# Patient Record
Sex: FEMALE | Race: WHITE | HISPANIC OR LATINO | URBAN - METROPOLITAN AREA
[De-identification: names, ages, dates, MRNs, and addresses within clinical notes are randomized per-mention and may not be internally consistent; named-entity substitution may affect disease eponyms.]

---

## 2019-10-16 ENCOUNTER — LAB (OUTPATIENT)
Dept: LAB | Facility: URGENT CARE | Age: 44
End: 2019-10-16

## 2019-10-16 PROCEDURE — 99000 SPECIMEN HANDLING OFFICE-LAB: CPT | Performed by: PREVENTIVE MEDICINE

## 2021-04-01 DIAGNOSIS — Z23 HIGH PRIORITY FOR 2019-NCOV VACCINE: ICD-10-CM

## 2021-04-12 ENCOUNTER — CLINICAL SUPPORT (OUTPATIENT)
Dept: FAMILY MEDICINE | Facility: CLINIC | Age: 46
End: 2021-04-12

## 2021-04-12 DIAGNOSIS — Z23 HIGH PRIORITY FOR 2019-NCOV VACCINE: ICD-10-CM

## 2021-04-12 DIAGNOSIS — Z23 ENCOUNTER FOR VACCINATION: Primary | ICD-10-CM

## 2021-05-03 ENCOUNTER — CLINICAL SUPPORT (OUTPATIENT)
Dept: FAMILY MEDICINE | Facility: CLINIC | Age: 46
End: 2021-05-03

## 2021-05-03 DIAGNOSIS — Z23 ENCOUNTER FOR VACCINATION: Primary | ICD-10-CM

## 2021-06-02 ENCOUNTER — RECORDS - HEALTHEAST (OUTPATIENT)
Dept: ADMINISTRATIVE | Facility: CLINIC | Age: 46
End: 2021-06-02

## 2021-12-13 ENCOUNTER — CLINICAL SUPPORT (OUTPATIENT)
Dept: FAMILY MEDICINE | Facility: CLINIC | Age: 46
End: 2021-12-13
Payer: COMMERCIAL

## 2021-12-13 DIAGNOSIS — Z23 ENCOUNTER FOR VACCINATION: Primary | ICD-10-CM

## 2021-12-13 PROCEDURE — 91300 PFIZER-BIONTECH SARS-COV-2 VACCINE: CPT | Performed by: NURSE PRACTITIONER

## 2021-12-13 PROCEDURE — 0004A PFIZER-BIONTECH SARS-COV-2 VACCINE: CPT | Performed by: NURSE PRACTITIONER

## 2025-06-17 DIAGNOSIS — Z01.812 ENCOUNTER FOR PREPROCEDURAL LABORATORY EXAMINATION: ICD-10-CM

## 2025-07-08 ENCOUNTER — CONSULT (OUTPATIENT)
Dept: PULMONOLOGY | Facility: CLINIC | Age: 50
End: 2025-07-08
Payer: COMMERCIAL

## 2025-07-08 VITALS
RESPIRATION RATE: 16 BRPM | DIASTOLIC BLOOD PRESSURE: 72 MMHG | SYSTOLIC BLOOD PRESSURE: 114 MMHG | HEART RATE: 70 BPM | WEIGHT: 175 LBS | OXYGEN SATURATION: 97 %

## 2025-07-08 DIAGNOSIS — F17.200 SMOKER: ICD-10-CM

## 2025-07-08 DIAGNOSIS — R06.09 DOE (DYSPNEA ON EXERTION): ICD-10-CM

## 2025-07-08 DIAGNOSIS — R91.8 GROUND GLASS OPACITY PRESENT ON IMAGING OF LUNG: ICD-10-CM

## 2025-07-08 DIAGNOSIS — R91.1 PULMONARY NODULE: Primary | ICD-10-CM

## 2025-07-08 PROCEDURE — 99204 OFFICE O/P NEW MOD 45 MIN: CPT | Performed by: INTERNAL MEDICINE

## 2025-07-08 RX ORDER — SERTRALINE HYDROCHLORIDE 25 MG/1
25 TABLET, FILM COATED ORAL DAILY
COMMUNITY

## 2025-07-08 RX ORDER — BUPROPION HYDROCHLORIDE 150 MG/1
150 TABLET ORAL EVERY MORNING
COMMUNITY

## 2025-07-08 RX ORDER — SERTRALINE HYDROCHLORIDE 50 MG/1
50 TABLET, FILM COATED ORAL DAILY
COMMUNITY

## 2025-07-08 NOTE — LETTER
[facility] PULMONOLOGY  [address] [facility] [place] [id number] [phone]  Dept: [phone]  07/09/25      Adelina Jack, Cnp  [address] [address]        To: Adelina Jack, ALEX      Thank you for referring your patient, Supriya Barron, to receive care in my office. I have enclosed a copy of the note for Supriya from 07/09/25.    Please contact me with any questions you may have regarding the visit.    Sincerely,         Xavier Alcala, DO  [address] [facility] [id number] [phone]    CC: No, Pcp

## 2025-07-08 NOTE — LETTER
[facility] PULMONOLOGY  [facility] [address] [place] [address]   Dept: [phone]    July 9, 2025     Adelina Jack, CNP  [address] [facility] [place] [address]    Patient: Supriya Barron   YOB: 1975   Date of Visit: 7/8/2025       Dear Dr. Jack:    Thank you for referring Supriya Barron to me for evaluation. Below are my notes for this consultation.    If you have questions, please do not hesitate to call me. I look forward to following your patient along with you.         Sincerely,        Xavier Alcala DO        CC: MD Cari Mckay Daniel, DO  7/9/2025  4:49 PM  Sign when Signing Visit  Pulmonary Consultation Note    Referring:  Dr. Adelina Jack    Reason:  Pulmonary nodule    HPI: 50-year-old female with past medical history significant cerebral aneurysm status post clipping presents to pulmonary clinic today at request of Dr. Adelina Jack for evaluation of a pulmonary nodule.    The patient tells me that she had not seen a physician for 15 years.  She told me she requested a full body CT scan.  On the scan, she was found to have a 8 to 9 mm left lower lobe pulmonary nodule.    The patient does have some mild shortness of breath.  She tells me she is put on 15 to 20 pounds over the past few months.  She is able to do the things she wants to do if she goes at her own pace.  She rarely coughs.  If she does it is a little bit worse in the morning.  It seldom productive.  If so it is clear mucus.  She denies any significant wheeze.  She is not on any inhaled medications.  She is never been diagnosed with underlying lung disease.  She denies any skin rashes.  She denies any hot or swollen joints.  She does think that anxiety will increase her shortness of breath some.    She smoked about a half a pack of cigarettes a day for 30 years.  She continuing to smoke.  She does want to quit.  There is a dog in the home.  She does not have a hot tub.   She has no exotic hobbies.  She has no significant obvious occupational exposures.      I reviewed the patient's CT scan of the chest with her in clinic today.  She has an 8 to 9 mm left lower lobe pulmonary nodule.  Her ABCs are relatively normal.  In her bases she does have a mosaic pattern and perhaps some mild groundglass.  She does not have overt reticulations or honeycombing.    The patient is planning on having a hysterectomy later this month.  She did have a mother with ovarian cancer.  She is having heavy menstrual bleeding.    The patient has no other specific complaints today.  Her comprehensive review of systems was discussed and otherwise negative.      Past medical history:  None    Family history: She had a mother with ovarian cancer    Social History     Tobacco Use   • Smoking status: Every Day     Types: Cigarettes   Vaping Use   • Vaping status: Some Days       No Known Allergies      Objective:    Vitals:    07/08/25 1531   BP: 114/72   BP Location: Left arm   Patient Position: Sitting   Cuff Size: Long Adult   Pulse: 70   Resp: 16   SpO2: 97%   Weight: 79.4 kg (175 lb)       Physical Exam:    Gen:  NAD  HEENT:  PERRL.  EOMI.  Oral mucosa pink and moist.  Neck:  Supple.  No masses.  CV:  RRR without m/g/r.    Lungs:  CTAB.  No wheeze nor crackles.  Abd:  S and NT.  BS+.  No guarding nor rigidity.  Ext:  No edema.  Neuro:  A & O x 3.    Psych:  No anxiety.  Skin:  No exposed rash anteriorly.      Assessment:    8-9 mm left lower lobe pulmonary first noted on CT scan of the chest in June 2025  Interstitial changes in the bilateral bases--groundglass versus mosaic without crackles on exam  15-20-pack-year history of smoking with ongoing tobaccoism  Upcoming GYN surgery       Plan:    CT scan of the chest in mid September  Complete PFTs  Smoking cessation was discussed and strongly recommended recommend  I have no contraindication from a pulmonary perspective to proceed with surgery as  planned  Follow-up after CT scan has been completed or sooner if necessary    Discussion:    Ms. Barron has a 9 mm left lower lobe left lower lobe pulm nodule..  This first noted on the CT scan of the chest imaging as noted in 2025.  We discussed the differential diagnosis including, scar, inflammation, focus of infection or malignancy.  She does have a smoking history.  She also has some subtle parenchymal changes.  There is a little bit of a mosaic pattern versus some groundglass in her bases.  She does not have crackles on exam.  She has mild shortness of breath with exertion.  We discussed proceeding with biopsy (bronchoscopy) versus watchful waiting approach.  The patient does have an upcoming surgery.  She appropriately wanted to hold off on biopsy.  We will repeat a CT scan of the chest in 3 months.  If the nodule has grown, we will proceed with biopsy procedure.  As for her subtle interstitial changes, would like to start with pulmonary function studies.  Ideally we will get these done prior to her surgery.  If they cannot, I would hold off until I see her again.  She is not having any rheumatologic type symptoms.  This could represent some bronchiolitis from smoking.  I recommended that in the interim she quit smoking.  Will see how things look on her follow-up CT scan and how she is doing symptomatically to determine additional workup.  At this point, I have no reason why she cannot proceed with surgery as scheduled.    Return for Sept-Oct after CT completed.  .  If necessary, please make an appointment sooner.    Xavier Alcala, DO

## 2025-07-09 NOTE — PROGRESS NOTES
Pulmonary Consultation Note    Referring:  Dr. Adelina Jack    Reason:  Pulmonary nodule    HPI: 50-year-old female with past medical history significant cerebral aneurysm status post clipping presents to pulmonary clinic today at request of Dr. Adelina Jack for evaluation of a pulmonary nodule.    The patient tells me that she had not seen a physician for 15 years.  She told me she requested a full body CT scan.  On the scan, she was found to have a 8 to 9 mm left lower lobe pulmonary nodule.    The patient does have some mild shortness of breath.  She tells me she is put on 15 to 20 pounds over the past few months.  She is able to do the things she wants to do if she goes at her own pace.  She rarely coughs.  If she does it is a little bit worse in the morning.  It seldom productive.  If so it is clear mucus.  She denies any significant wheeze.  She is not on any inhaled medications.  She is never been diagnosed with underlying lung disease.  She denies any skin rashes.  She denies any hot or swollen joints.  She does think that anxiety will increase her shortness of breath some.    She smoked about a half a pack of cigarettes a day for 30 years.  She continuing to smoke.  She does want to quit.  There is a dog in the home.  She does not have a hot tub.  She has no exotic hobbies.  She has no significant obvious occupational exposures.      I reviewed the patient's CT scan of the chest with her in clinic today.  She has an 8 to 9 mm left lower lobe pulmonary nodule.  Her ABCs are relatively normal.  In her bases she does have a mosaic pattern and perhaps some mild groundglass.  She does not have overt reticulations or honeycombing.    The patient is planning on having a hysterectomy later this month.  She did have a mother with ovarian cancer.  She is having heavy menstrual bleeding.    The patient has no other specific complaints today.  Her comprehensive review of systems was discussed and otherwise negative.       Past medical history:  None    Family history: She had a mother with ovarian cancer    Social History     Tobacco Use    Smoking status: Every Day     Types: Cigarettes   Vaping Use    Vaping status: Some Days       No Known Allergies      Objective:    Vitals:    07/08/25 1531   BP: 114/72   BP Location: Left arm   Patient Position: Sitting   Cuff Size: Long Adult   Pulse: 70   Resp: 16   SpO2: 97%   Weight: 79.4 kg (175 lb)       Physical Exam:    Gen:  NAD  HEENT:  PERRL.  EOMI.  Oral mucosa pink and moist.  Neck:  Supple.  No masses.  CV:  RRR without m/g/r.    Lungs:  CTAB.  No wheeze nor crackles.  Abd:  S and NT.  BS+.  No guarding nor rigidity.  Ext:  No edema.  Neuro:  A & O x 3.    Psych:  No anxiety.  Skin:  No exposed rash anteriorly.      Assessment:    8-9 mm left lower lobe pulmonary first noted on CT scan of the chest in June 2025  Interstitial changes in the bilateral bases--groundglass versus mosaic without crackles on exam  15-20-pack-year history of smoking with ongoing tobaccoism  Upcoming GYN surgery       Plan:    CT scan of the chest in mid September  Complete PFTs  Smoking cessation was discussed and strongly recommended recommend  I have no contraindication from a pulmonary perspective to proceed with surgery as planned  Follow-up after CT scan has been completed or sooner if necessary    Discussion:    Ms. Barron has a 9 mm left lower lobe left lower lobe pulm nodule..  This first noted on the CT scan of the chest imaging as noted in 2025.  We discussed the differential diagnosis including, scar, inflammation, focus of infection or malignancy.  She does have a smoking history.  She also has some subtle parenchymal changes.  There is a little bit of a mosaic pattern versus some groundglass in her bases.  She does not have crackles on exam.  She has mild shortness of breath with exertion.  We discussed proceeding with biopsy (bronchoscopy) versus watchful waiting approach.  The patient  does have an upcoming surgery.  She appropriately wanted to hold off on biopsy.  We will repeat a CT scan of the chest in 3 months.  If the nodule has grown, we will proceed with biopsy procedure.  As for her subtle interstitial changes, would like to start with pulmonary function studies.  Ideally we will get these done prior to her surgery.  If they cannot, I would hold off until I see her again.  She is not having any rheumatologic type symptoms.  This could represent some bronchiolitis from smoking.  I recommended that in the interim she quit smoking.  Will see how things look on her follow-up CT scan and how she is doing symptomatically to determine additional workup.  At this point, I have no reason why she cannot proceed with surgery as scheduled.    Return for Sept-Oct after CT completed.  .  If necessary, please make an appointment sooner.    Xavier Alcala, DO

## 2025-07-19 ENCOUNTER — LAB (OUTPATIENT)
Dept: LAB | Facility: HOSPITAL | Age: 50
End: 2025-07-19
Payer: COMMERCIAL

## 2025-07-19 DIAGNOSIS — Z01.812 ENCOUNTER FOR PREPROCEDURAL LABORATORY EXAMINATION: ICD-10-CM

## 2025-07-19 LAB
ABO GROUP (TYPE) IN BLOOD: NORMAL
ABO GROUP (TYPE) IN BLOOD: NORMAL
ANTIBODY SCREEN: NORMAL
BASOPHILS # BLD AUTO: 0.1 10*3/UL
BASOPHILS NFR BLD AUTO: 0.8 % (ref 0–2)
D AG BLD QL: NORMAL
D AG BLD QL: NORMAL
EOSINOPHIL # BLD AUTO: 0.1 10*3/UL
EOSINOPHIL NFR BLD AUTO: 1.6 % (ref 0–3)
ERYTHROCYTE [DISTWIDTH] IN BLOOD BY AUTOMATED COUNT: 15.2 % (ref 11.5–14)
HCT VFR BLD AUTO: 38.4 % (ref 34–45)
HGB BLD-MCNC: 13.6 G/DL (ref 11.5–15.5)
LYMPHOCYTES # BLD AUTO: 2.3 10*3/UL
LYMPHOCYTES NFR BLD AUTO: 26.6 % (ref 11–47)
MCH RBC QN AUTO: 29.5 PG (ref 28–33)
MCHC RBC AUTO-ENTMCNC: 35.6 G/DL (ref 32–36)
MCV RBC AUTO: 82.9 FL (ref 81–97)
MONOCYTES # BLD AUTO: 0.6 10*3/UL
MONOCYTES NFR BLD AUTO: 7.1 % (ref 3–11)
NEUTROPHILS # BLD AUTO: 5.5 10*3/UL
NEUTROPHILS NFR BLD AUTO: 63.9 % (ref 41–81)
PLATELET # BLD AUTO: 336 10*3/UL (ref 140–350)
PMV BLD AUTO: 8.5 FL (ref 6.9–10.8)
RBC # BLD AUTO: 4.63 10*6/UL (ref 3.7–5.3)
WBC # BLD AUTO: 8.6 10*3/UL (ref 4.5–10.5)

## 2025-07-19 PROCEDURE — 86885 COOMBS TEST INDIRECT QUAL: CPT

## 2025-07-19 PROCEDURE — 36415 COLL VENOUS BLD VENIPUNCTURE: CPT

## 2025-07-19 PROCEDURE — 85025 COMPLETE CBC W/AUTO DIFF WBC: CPT
